# Patient Record
Sex: MALE | Race: AMERICAN INDIAN OR ALASKA NATIVE
[De-identification: names, ages, dates, MRNs, and addresses within clinical notes are randomized per-mention and may not be internally consistent; named-entity substitution may affect disease eponyms.]

---

## 2020-09-21 ENCOUNTER — HOSPITAL ENCOUNTER (EMERGENCY)
Dept: HOSPITAL 46 - ED | Age: 35
Discharge: HOME | End: 2020-09-21
Payer: COMMERCIAL

## 2020-09-21 VITALS — WEIGHT: 150 LBS | BODY MASS INDEX: 21 KG/M2 | HEIGHT: 71 IN

## 2020-09-21 DIAGNOSIS — S00.03XA: Primary | ICD-10-CM
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
## 2020-09-21 DIAGNOSIS — F17.200: ICD-10-CM

## 2021-12-05 ENCOUNTER — HOSPITAL ENCOUNTER (EMERGENCY)
Dept: HOSPITAL 46 - ED | Age: 36
Discharge: HOME | End: 2021-12-05
Payer: COMMERCIAL

## 2021-12-05 VITALS — HEIGHT: 71 IN | WEIGHT: 167.77 LBS | BODY MASS INDEX: 23.49 KG/M2

## 2021-12-05 DIAGNOSIS — F17.200: ICD-10-CM

## 2021-12-05 DIAGNOSIS — M54.50: Primary | ICD-10-CM

## 2021-12-05 PROCEDURE — A9270 NON-COVERED ITEM OR SERVICE: HCPCS

## 2022-01-01 ENCOUNTER — HOSPITAL ENCOUNTER (EMERGENCY)
Dept: HOSPITAL 46 - ED | Age: 37
Discharge: HOME | End: 2022-01-01
Payer: COMMERCIAL

## 2022-01-01 VITALS — BODY MASS INDEX: 22.47 KG/M2 | HEIGHT: 71 IN | WEIGHT: 160.5 LBS

## 2022-01-01 DIAGNOSIS — Z79.899: ICD-10-CM

## 2022-01-01 DIAGNOSIS — F17.200: ICD-10-CM

## 2022-01-01 DIAGNOSIS — R06.02: Primary | ICD-10-CM

## 2022-01-01 NOTE — XMS
PreManage Notification: MARIBEL GUILLAUME MRN:[id number]
 
Security Information
 
Security Events
No recent Security Events currently on file
 
 
 
CRITERIA MET
------------
- [facility] - 2 Visits in 30 Days
 
 
CARE PROVIDERS
There are no care providers on record at this time.
 
Lili has no Care Guidelines for this patient.
 
[facility] VISIT COUNT (12 MO.)
-------------------------------------------------------------------------------------
[facility]
-------------------------------------------------------------------------------------
TOTAL 2
-------------------------------------------------------------------------------------
NOTE: Visits indicate total known visits.
 
[place] [place] ([facility] [place]
-------------------------------------------------------------------------------------
01[facility]01/2022 21:11
[facility]
 
TYPE: Emergency
 
COMPLAINT:
- SHORTNESS OF BREATH
-------------------------------------------------------------------------------------
12/05/2021 04:39
[facility]
 
TYPE: Emergency
 
COMPLAINT:
- BACK PAIN
 
DIAGNOSES:
- LOW BACK PAIN, UNSPECIFIED
- Nicotine dependence, unspecified, uncomplicated
-------------------------------------------------------------------------------------
 
 
INPATIENT VISIT TRACKING (12 MO.)
No inpatient visits to display in this time frame
 
[email or web address]